# Patient Record
Sex: MALE | Race: WHITE | NOT HISPANIC OR LATINO | Employment: STUDENT | URBAN - NONMETROPOLITAN AREA
[De-identification: names, ages, dates, MRNs, and addresses within clinical notes are randomized per-mention and may not be internally consistent; named-entity substitution may affect disease eponyms.]

---

## 2022-09-26 ENCOUNTER — OFFICE VISIT (OUTPATIENT)
Dept: FAMILY MEDICINE CLINIC | Facility: CLINIC | Age: 22
End: 2022-09-26

## 2022-09-26 VITALS
DIASTOLIC BLOOD PRESSURE: 70 MMHG | TEMPERATURE: 98 F | BODY MASS INDEX: 26.46 KG/M2 | HEIGHT: 68 IN | OXYGEN SATURATION: 98 % | HEART RATE: 74 BPM | WEIGHT: 174.6 LBS | SYSTOLIC BLOOD PRESSURE: 130 MMHG

## 2022-09-26 DIAGNOSIS — Z20.822 CLOSE EXPOSURE TO COVID-19 VIRUS: ICD-10-CM

## 2022-09-26 DIAGNOSIS — R05.9 COUGH: Primary | ICD-10-CM

## 2022-09-26 PROCEDURE — 99213 OFFICE O/P EST LOW 20 MIN: CPT | Performed by: PHYSICIAN ASSISTANT

## 2022-09-26 NOTE — PROGRESS NOTES
"Chief Complaint  Cough (Patient reports he started feeling bad yesterday)    Subjective        David Walker presents to [facility]  History of Present Illness  Patient reports today secondary to starting to feel bad yesterday.  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  Patient reports having cough, sore throat and nasal drainage.  Patient denies any fever or chills.  Patient denies any nausea or vomiting, shortness of breath or difficulty breathing.  Patient reports he has not taken any medication so far  Cough        Objective   Vital Signs:  /70   Pulse 74   Temp 98 °F (36.7 °C)   Ht 172.7 cm (68\")   Wt 79.2 kg (174 lb 9.6 oz)   SpO2 98%   BMI 26.55 kg/m²   Estimated body mass index is 26.55 kg/m² as calculated from the following:    Height as of this encounter: 172.7 cm (68\").    Weight as of this encounter: 79.2 kg (174 lb 9.6 oz).          Physical Exam  Vitals and nursing note reviewed.   Constitutional:       General: He is not in acute distress.     Appearance: Normal appearance.   HENT:      Head: Normocephalic.      Right Ear: Hearing and tympanic membrane normal.      Left Ear: Hearing and tympanic membrane normal.      Mouth/Throat:      Pharynx: No oropharyngeal exudate.   Eyes:      Pupils: Pupils are equal, round, and reactive to light.   Cardiovascular:      Rate and Rhythm: Normal rate and regular rhythm.   Pulmonary:      Effort: Pulmonary effort is normal. No respiratory distress.   Skin:     General: Skin is warm and dry.   Neurological:      Mental Status: He is alert.   Psychiatric:         Mood and Affect: Mood normal.        Result Review :                Assessment and Plan   Diagnoses and all orders for this visit:    1. Cough (Primary)  Assessment & Plan:  Patient negative for COVID, strep and flu today.  Patient advised to take over-the-counter antihistamines and " decongestants.  Advised patient increase fluids as tolerated.  Advised patient if he continues to feel bad return to clinic in 2 to 3 days for follow-up patient knowledge understanding.      2. Close exposure to COVID-19 virus  Assessment & Plan:  See plan above             Follow Up   No follow-ups on file.  Patient was given instructions and counseling regarding his condition or for health maintenance advice. Please see specific information pulled into the AVS if appropriate.

## 2022-09-26 NOTE — ASSESSMENT & PLAN NOTE
Patient negative for COVID, strep and flu today.  Patient advised to take over-the-counter antihistamines and decongestants.  Advised patient increase fluids as tolerated.  Advised patient if he continues to feel bad return to clinic in 2 to 3 days for follow-up patient knowledge understanding.

## 2023-09-20 ENCOUNTER — OFFICE VISIT (OUTPATIENT)
Dept: FAMILY MEDICINE CLINIC | Facility: CLINIC | Age: 23
End: 2023-09-20

## 2023-09-20 VITALS
TEMPERATURE: 98.3 F | HEART RATE: 76 BPM | HEIGHT: 68 IN | BODY MASS INDEX: 26.83 KG/M2 | OXYGEN SATURATION: 98 % | WEIGHT: 177 LBS

## 2023-09-20 DIAGNOSIS — J06.9 VIRAL UPPER RESPIRATORY TRACT INFECTION: Primary | ICD-10-CM

## 2023-09-20 NOTE — PROGRESS NOTES
".Chief Complaint  Sore Throat    Subjective          History of Present Illness  David Walker is here today with a sore throat    Patient states since yesterday he has had a runny nose sore throat and painful ears.  He states that he has not had any fever or chills.  He has had a bit of a cough.  He states that he does have seasonal allergies on occasion and denies any sick contacts.  He denies any discolored drainage and has been taking no over-the-counter medications.      Objective   Vital Signs:   Pulse 76   Temp 98.3 °F (36.8 °C)   Ht 172.7 cm (68\")   Wt 80.3 kg (177 lb)   SpO2 98%   BMI 26.91 kg/m²     Body mass index is 26.91 kg/m².      Review of Systems    No current outpatient medications on file.    Allergies: Patient has no known allergies.    Physical Exam  Vitals and nursing note reviewed.   Constitutional:       Appearance: Normal appearance. He is normal weight.   HENT:      Head: Normocephalic and atraumatic.      Right Ear: Tympanic membrane, ear canal and external ear normal.      Left Ear: Tympanic membrane, ear canal and external ear normal.      Nose: Congestion present.      Mouth/Throat:      Mouth: Mucous membranes are moist.      Comments: Clear post nasal drainage  Eyes:      Pupils: Pupils are equal, round, and reactive to light.   Cardiovascular:      Rate and Rhythm: Normal rate and regular rhythm.      Heart sounds: Normal heart sounds.   Pulmonary:      Effort: Pulmonary effort is normal.      Breath sounds: Normal breath sounds.   Neurological:      General: No focal deficit present.      Mental Status: He is alert.   Psychiatric:         Mood and Affect: Mood normal.        Result Review :              POC strep Flu A and B and COVID are all negative     Assessment and Plan    Diagnoses and all orders for this visit:    1. Viral upper respiratory tract infection (Primary)    Discussed with patient that his COVID strep and flu test were all negative today.  When asked that " he treat his symptoms with over-the-counter medications including DayQuil or NyQuil or [place] cold.  If he develops fever increased cough or discolored drainage she is to come back to our office or be seen by [facility].      Follow Up   No follow-ups on file.  Patient was given instructions and counseling regarding his condition or for health maintenance advice. Please see specific information pulled into the AVS if appropriate.     DOMINIC Marie  09/20/2023

## 2023-09-20 NOTE — LETTER
September 20, 2023     Patient: David Walker   YOB: 2000   Date of Visit: 9/20/2023       To Whom It May Concern:     David Walker was seen in my office today. He may return to work on 9-21-23 .           Sincerely,        Rosa Plata PA-C    CC: No Recipients

## 2023-10-23 ENCOUNTER — OFFICE VISIT (OUTPATIENT)
Dept: FAMILY MEDICINE CLINIC | Facility: CLINIC | Age: 23
End: 2023-10-23

## 2023-10-23 VITALS
OXYGEN SATURATION: 97 % | HEART RATE: 75 BPM | WEIGHT: 178 LBS | RESPIRATION RATE: 16 BRPM | DIASTOLIC BLOOD PRESSURE: 72 MMHG | TEMPERATURE: 98.2 F | HEIGHT: 68 IN | SYSTOLIC BLOOD PRESSURE: 126 MMHG | BODY MASS INDEX: 26.98 KG/M2

## 2023-10-23 DIAGNOSIS — R09.82 POST-NASAL DRAINAGE: ICD-10-CM

## 2023-10-23 DIAGNOSIS — J30.1 SEASONAL ALLERGIC RHINITIS DUE TO POLLEN: Primary | ICD-10-CM

## 2023-10-23 NOTE — PROGRESS NOTES
"    Office Note     Name: David Walker    : 2000     MRN: [id number]     Chief Complaint  No chief complaint on file.    Subjective     History of Present Illness:  David Walker is a 23 y.o. male who presents today for complaints of hoarseness, coughing with mucus production x1 day. States this AM coughed up brownish mucus, also has ST. Denies nasal congestion; admits to feeling like has chest congestion.  Denies fever, n/v/abd pain/diarrhea. Hasn't taken anything for s/s.     Review of Systems:   Review of Systemsas per HPI.     Family History:   Family History   Problem Relation Age of Onset    Aortic aneurysm Father     Heart attack Paternal Grandmother     Diabetes Paternal Grandmother        Social History:   Social History     Socioeconomic History    Marital status: Single   Tobacco Use    Smoking status: Never    Smokeless tobacco: Never   Vaping Use    Vaping Use: Every day       Past Medical History: History reviewed. No pertinent past medical history.    Past Surgical History:   Past Surgical History:   Procedure Laterality Date    WISDOM TOOTH EXTRACTION Bilateral        Immunizations:   There is no immunization history on file for this patient.     Medications:   No current outpatient medications on file.    Allergies:   No Known Allergies    Objective     Vital Signs  /72   Pulse 75   Temp 98.2 °F (36.8 °C)   Resp 16   Ht 172.7 cm (68\")   Wt 80.7 kg (178 lb)   SpO2 97%   BMI 27.06 kg/m²   Estimated body mass index is 27.06 kg/m² as calculated from the following:    Height as of this encounter: 172.7 cm (68\").    Weight as of this encounter: 80.7 kg (178 lb).          Physical Exam  Vitals and nursing note reviewed.   Constitutional:       Appearance: Normal appearance.   HENT:      Head: Normocephalic and atraumatic.      Right Ear: Tympanic membrane, ear canal and external ear normal.      Left Ear: Tympanic membrane, ear canal and external ear normal.      Nose: " Congestion and rhinorrhea present.      Right Turbinates: Swollen.      Left Turbinates: Swollen.      Right Sinus: No maxillary sinus tenderness or frontal sinus tenderness.      Left Sinus: No maxillary sinus tenderness or frontal sinus tenderness.      Mouth/Throat:      Mouth: Mucous membranes are moist.      Pharynx: Oropharynx is clear. Posterior oropharyngeal erythema present. No oropharyngeal exudate or uvula swelling.      Tonsils: No tonsillar exudate or tonsillar abscesses.   Eyes:      General:         Right eye: No discharge.         Left eye: No discharge.      Extraocular Movements: Extraocular movements intact.      Pupils: Pupils are equal, round, and reactive to light.   Neck:      Comments: C/o mild tenderness over L anterior cervical area but no gross lymphadenopathy  Cardiovascular:      Rate and Rhythm: Normal rate and regular rhythm.      Heart sounds: No murmur heard.  Pulmonary:      Effort: Pulmonary effort is normal.      Breath sounds: Normal breath sounds.   Abdominal:      General: Abdomen is flat.      Palpations: Abdomen is soft.   Musculoskeletal:         General: Normal range of motion.      Cervical back: Normal range of motion and neck supple.   Lymphadenopathy:      Cervical: No cervical adenopathy.   Skin:     General: Skin is warm and dry.   Neurological:      Mental Status: He is alert and oriented to person, place, and time.   Psychiatric:         Mood and Affect: Mood normal.         Behavior: Behavior normal.          Procedures    Assessment and Plan     1. Seasonal allergic rhinitis due to pollen      2. Post-nasal drainage     In house POCT strep/covid/flu negative. Reviewed results with patient. Encouraged increased intake of fluids and consider use of OTC antihistamine (zyrtex/allegra) as well as OTC robitussin DM or mucinex DM. Encouraged to RTC if s/s worsen or do not improve with OTC medications. School note given today that patient was seen and evaluated. Patient  stated understanding and agreed with POC.     Follow Up  Return if symptoms worsen or fail to improve.    YOGESH [facility]  [facility] [address] [facility] [address]  [facility] [address] [phone]

## 2023-10-23 NOTE — LETTER
October 23, 2023     Patient: David Walker   YOB: 2000   Date of Visit: 10/23/2023       To Whom it May Concern:    David Walker was evaluated and treated in my clinic on 10/23/2023. He may return to full activities today.        Sincerely,          YOGESH Chino        CC: No Recipients

## 2025-01-31 ENCOUNTER — OFFICE VISIT (OUTPATIENT)
Dept: FAMILY MEDICINE CLINIC | Facility: CLINIC | Age: 25
End: 2025-01-31

## 2025-01-31 VITALS
BODY MASS INDEX: 23.85 KG/M2 | HEIGHT: 69 IN | OXYGEN SATURATION: 99 % | HEART RATE: 88 BPM | WEIGHT: 161 LBS | DIASTOLIC BLOOD PRESSURE: 72 MMHG | SYSTOLIC BLOOD PRESSURE: 122 MMHG

## 2025-01-31 DIAGNOSIS — Z20.828 EXPOSURE TO THE FLU: ICD-10-CM

## 2025-01-31 DIAGNOSIS — J11.1 URI DUE TO INFLUENZA: Primary | ICD-10-CM

## 2025-01-31 PROCEDURE — 99213 OFFICE O/P EST LOW 20 MIN: CPT | Performed by: PHYSICIAN ASSISTANT

## 2025-01-31 NOTE — LETTER
January 31, 2025     Patient: David Walker   YOB: 2000   Date of Visit: 1/31/2025       To Whom it May Concern:    David Walker was seen in my clinic on 1/31/2025. He  may return to school in four days.           Sincerely,          Rosa Plata PA-C        CC: No Recipients

## 2025-01-31 NOTE — PROGRESS NOTES
".Chief Complaint  Influenza (Exposed; roommate has flu) and Sore Throat    Subjective          History of Present Illness  David Walker is here today with his  History of Present Illness  The patient presents for evaluation of influenza-like symptoms.    He began experiencing symptoms approximately 1.5 days ago, coinciding with his roommate's influenza diagnosis 2 to 3 days prior. His symptoms include postnasal drainage and sore throat. Upon awakening this morning, he experienced severe fatigue, cold sweats, and difficulty rising from bed. He reports minimal coughing and no wheezing. He has not received the influenza vaccine this year and has not taken any over-the-counter medications such as DayQuil or NyQuil. He has a past medical history of asthma dating back to his third-grade years but does not currently use albuterol.        Objective   Vital Signs:   /72 (BP Location: Left arm, Patient Position: Sitting, Cuff Size: Adult)   Pulse 88   Ht 174 cm (68.5\")   Wt 73 kg (161 lb)   SpO2 99%   BMI 24.12 kg/m²     Body mass index is 24.12 kg/m².  BMI is within normal parameters. No other follow-up for BMI required.      Review of Systems    No current outpatient medications on file.    Allergies: Patient has no known allergies.    Physical Exam  Vitals and nursing note reviewed.   Constitutional:       General: He is not in acute distress.     Appearance: Normal appearance. He is normal weight. He is not ill-appearing, toxic-appearing or diaphoretic.   HENT:      Head: Normocephalic.      Right Ear: Tympanic membrane, ear canal and external ear normal.      Left Ear: Tympanic membrane, ear canal and external ear normal.      Nose: Congestion present.      Mouth/Throat:      Mouth: Mucous membranes are moist.      Comments: Clear post nasal drainage  Eyes:      Pupils: Pupils are equal, round, and reactive to light.   Cardiovascular:      Rate and Rhythm: Normal rate and regular rhythm.      Heart sounds: " Normal heart sounds.   Pulmonary:      Effort: Pulmonary effort is normal.      Breath sounds: Normal breath sounds.   Neurological:      General: No focal deficit present.      Mental Status: He is alert.   Psychiatric:         Mood and Affect: Mood normal.         Behavior: Behavior normal.        Physical Exam      Result Review :          Results  Laboratory Studies  Strep test is negative. Influenza test is negative. COVID-19 test is negative.             Assessment and Plan    Diagnoses and all orders for this visit:    1. URI due to influenza (Primary)  Despite negative results for strep, influenza, and COVID-19, the clinical presentation and exposure suggests a probable case of influenza that is in early enough stage that our POC testing has not picked it up yet. He is advised to maintain adequate rest and hydration. Over-the-counter medications such as DayQuil, NyQuil, and ibuprofen are recommended for symptom management. He is also instructed to wear a mask in public settings to prevent potential transmission. A note for school will be provided. If symptoms worsen or do not improve, he should seek further medical attention.  2. Exposure to the flu      Assessment & Plan        Follow Up   No follow-ups on file.  Patient was given instructions and counseling regarding his condition or for health maintenance advice. Please see specific information pulled into the AVS if appropriate.     Patient or patient representative verbalized consent for the use of Ambient Listening during the visit with  DOMINIC Marie for chart documentation. 1/31/2025  13:45 DOMINIC Duval  01/31/2025       Patient